# Patient Record
Sex: FEMALE | Race: WHITE
[De-identification: names, ages, dates, MRNs, and addresses within clinical notes are randomized per-mention and may not be internally consistent; named-entity substitution may affect disease eponyms.]

---

## 2019-04-10 ENCOUNTER — HOSPITAL ENCOUNTER (EMERGENCY)
Dept: HOSPITAL 10 - FTE | Age: 49
Discharge: HOME | End: 2019-04-10
Payer: MEDICAID

## 2019-04-10 ENCOUNTER — HOSPITAL ENCOUNTER (EMERGENCY)
Dept: HOSPITAL 91 - FTE | Age: 49
Discharge: HOME | End: 2019-04-10
Payer: MEDICAID

## 2019-04-10 VITALS
HEIGHT: 59 IN | WEIGHT: 156.97 LBS | WEIGHT: 156.97 LBS | BODY MASS INDEX: 31.64 KG/M2 | HEIGHT: 59 IN | BODY MASS INDEX: 31.64 KG/M2

## 2019-04-10 VITALS — RESPIRATION RATE: 22 BRPM | DIASTOLIC BLOOD PRESSURE: 56 MMHG | SYSTOLIC BLOOD PRESSURE: 195 MMHG | HEART RATE: 101 BPM

## 2019-04-10 DIAGNOSIS — R21: Primary | ICD-10-CM

## 2019-04-10 DIAGNOSIS — I10: ICD-10-CM

## 2019-04-10 DIAGNOSIS — E11.9: ICD-10-CM

## 2019-04-10 PROCEDURE — 99283 EMERGENCY DEPT VISIT LOW MDM: CPT

## 2019-04-10 RX ADMIN — FAMOTIDINE 1 MG: 20 TABLET ORAL at 02:52

## 2019-04-10 NOTE — ERD
ER Documentation


Chief Complaint


Chief Complaint





SKIN RASH, ITCHING AND EYE REDNESS X 1 WEEK





HPI


48-year-old female presents with complaint of of rash and itching for the past 


week.  In addition she states that she has been having itching in her eyes with 


clear watery discharge.  These of both of her children have had the same 


symptoms for the same amount of time.  She states that they share a bed.  She 


thinks that they have bugs in the bed that are biting them.  Denies any 


treatments.  Denies any fevers, cough, allergies, wheezing, respiratory 


distress, stridor, cyanosis, nausea, vomiting.  Denies medical problems.  Denies


allergies.





ROS


All systems reviewed and are negative except as per history of present illness.





Medications


Home Meds


Active Scripts


Prednisone* (Prednisone*) 20 Mg Tab, 60 MG PO DAILY for allergic reaction for 4 


Days, TAB


   Prov:ZUNILDA QUINTANILLA         4/10/19


Permethrin* (Elimite*) 5% Cr, 1 APPLIC TOP ONCE for scabies, #1 TUB


   Prov:ZUNILDA QUINTANILLA         4/10/19


Diphenhydramine Hcl* (Benadryl*) 50 Mg Cap, 50 MG PO Q6H PRN for ITCHING/RASH, 


#30 CAP


   Prov:ZUNILDA QUINTANILLA         4/10/19


Omeprazole* (Omeprazole*) 40 Mg Capsule.dr, 40 MG PO DAILY, #14 CAP


   Prov:KAREEM CAR MD         10/29/16


Famotidine* (Pepcid*) 20 Mg Tablet, 20 MG PO BID for 30 Days, TAB


   Prov:KAREEM CAR MD         10/29/16


Mag Hydrox/Al Hydrox/Simeth (Maalox Advanced Suspension) 355 Ml Oral.susp, 2 TSP


PO TID for PAIN, #24 OZ


   Prov:KAREEM CAR MD         10/29/16





Allergies


Allergies:  


Coded Allergies:  


     No Known Allergy (Verified , 10/29/16)





PMhx/Soc


History of Surgery:  Yes ( X 2)


Anesthesia Reaction:  No


Hx Neurological Disorder:  No


Hx Respiratory Disorders:  No


Hx Cardiac Disorders:  Yes (htn)


Hx Psychiatric Problems:  No


Hx Miscellaneous Medical Probl:  Yes (dm, ANXIETY)


Hx Alcohol Use:  No


Hx Substance Use:  No


Hx Tobacco Use:  No


Smoking Status:  Never smoker





FmHx


Family History:  No diabetes, No coronary disease, No other





Physical Exam


Vitals





Vital Signs


  Date      Temp  Pulse  Resp  B/P (MAP)   Pulse Ox  O2          O2 Flow    FiO2


Time                                                 Delivery    Rate


   4/10/19  98.9    101    22      195/56        98


     02:46                          (102)





Physical Exam


Const:   No acute distress


Head:   Atraumatic 


Eyes:    Normal Conjunctiva


ENT:    Normal External Ears, Nose and Mouth.  Airway is patent and clear.  


There is no angioedema or tongue edema.


Neck:               Full range of motion. No meningismus.


Resp:   Clear to auscultation bilaterally


Cardio:   Regular rate and rhythm, no murmurs


Abd:    Soft, non tender, non distended. Normal bowel sounds


Skin:   Erythematous papules located over the legs stomach and back bilaterally.


 There is no discharge or signs of infection.


Back:   No midline or flank tenderness


Ext:    No cyanosis, or edema


Neur:   Awake and alert


Psych:    Normal Mood and Affect


Results 24 hrs





Current Medications


 Medications
   Dose
          Sig/Aidan
       Start Time
   Status  Last


 (Trade)       Ordered        Route
 PRN     Stop Time              Admin
Dose


                              Reason                                Admin


 Prednisone
    60 mg          ONCE  ONCE
    4/10/19       DC           4/10/19


(Prednisone)                  PO
            03:00
                       02:52



                                             4/10/19 03:01


 Famotidine
    40 mg          ONCE  ONCE
    4/10/19       DC           4/10/19


(Pepcid)                      PO
            03:00
                       02:52



                                             4/10/19 03:01








Procedures/MDM


Patient's presentation consistent with scabies or possible lice.  Patient was 


given prednisone in the ER.  Patient was not given Benadryl because patient is 


driving home.  I told patient she should have someone pick her up so I could 


give her Benadryl but she refused and said she would just take a prescription 


for it.  Patient discharged with Rx for prednisone, permethrin, Benadryl.  


Patient advised to place permethrin on now and then again in 14 days.  I have 


low suspicion for anaphylaxis, viral syndrome, or any other emergent condition. 


Patient discharged with strict ER precautions. Patient advised to follow up with


PMD. All questions answered at discharge.





Departure


Diagnosis:  


   Primary Impression:  


   Rash


Condition:  Stable


Patient Instructions:  Scabies, Permethrin Topical cream


Referrals:  


COMM[facility]ITY CLINICS


YOU HAVE RECEIVED A MEDICAL SCREENING EXAM AND THE RESULTS INDICATE THAT YOU DO 


NOT HAVE A CONDITION THAT REQUIRES URGENT TREATMENT IN THE EMERGENCY DEPARTMENT.





FURTHER EVALUATION AND TREATMENT OF YOUR CONDITION CAN WAIT UNTIL YOU ARE SEEN 


IN YOUR DOCTORS OFFICE WITHIN THE NEXT 1-2 DAYS. IT IS YOUR RESPONSIBILITY TO 


MAKE AN APPOINTMENT FOR FOLOW-UP CARE.





IF YOU HAVE A PRIMARY DOCTOR


--you should call your primary doctor and schedule an appointment





IF YOU DO NOT HAVE A PRIMARY DOCTOR YOU CAN CALL OUR PHYSICIAN REFERRAL HOTLINE 


AT


 [phone] 





IF YOU CAN NOT AFFORD TO SEE A PHYSICIAN YOU CAN CHOSE FROM THE FOLLOWING 


[facility] CLINICS





[facility] [phone] [address] [facility] [phone] [address] [facility]. [facility] [phone] [address] [facility] [phone] [address] [facility] [phone] [address] [facility]. [facility]. [phone]


[address]





Additional Instructions:  


FOLLOW UP WITH YOUR PRIMARY CARE PHYSICIAN TOMORROW.Return to this facility if 


you are not improving as expected.











ZUNILDA QUINTANILLA               Apr 10, 2019 03:41